# Patient Record
Sex: MALE | Race: WHITE | NOT HISPANIC OR LATINO | Employment: FULL TIME | URBAN - METROPOLITAN AREA
[De-identification: names, ages, dates, MRNs, and addresses within clinical notes are randomized per-mention and may not be internally consistent; named-entity substitution may affect disease eponyms.]

---

## 2020-05-17 ENCOUNTER — NURSE TRIAGE (OUTPATIENT)
Dept: NURSING | Facility: CLINIC | Age: 30
End: 2020-05-17

## 2020-05-17 NOTE — TELEPHONE ENCOUNTER
"\"I was lifting weights and doing pull ups last week , then I noticed some chest pressure and tightness feeling on the left side, paola under the pec area. Now it's more in the center of my chest, no pain. If I take a deep breath it doesn't make it worse, it's mild and it comes and goes.\" denies cough, SOB, fever or other sx. Triaged and advised if sx worsen go to ER, otherwise call PCP(non [facility] pt) in am 5/18 for an appt. Call back if needed.Mine Díaz RN [facility]    COVID 19 Nurse Triage Plan/Patient Instructions    Please be aware that novel coronavirus (COVID-19) may be circulating in the community. If you develop symptoms such as fever, cough, or SOB or if you have concerns about the presence of another infection including coronavirus (COVID-19), please contact your health care provider or visit [email or web address].     Disposition/Instructions    Patient to have scheduled Telephone Visit with a provider. Follow System Ambulatory Workflow for COVID 19.     The clinic staff will assist you to schedule an appointment to complete the Telephone Visit with a provider during normal clinic hours.       Call Back If: Your symptoms worsen before you are able to complete your Telephone Visit with a provider.    Thank you for limiting contact with others, wearing a simple mask to cover your cough, practice good hand hygiene habits and accessing our virtual services where possible to limit the spread of this virus.    For more information about COVID19 and options for caring for yourself at home, please visit the CDC website at https://www.cdc.gov/coronavirus/2019-ncov/about/steps-when-sick.html  For more options for care at [facility], please visit our website at [email or web address]    For more information, please use the [place] Department of Health COVID-19 Website: https://www.health.state.[place]/diseases/coronavirus/index.html  [place] Department of Health ([facility]) " "COVID-19 Hotlines (Interpreters available):      Health questions: Phone Number: [phone] or [phone] and Hours: 7 a.m. to 7 p.m.    Schools and  questions: Phone Number: [phone] or [phone] and Hours 7 a.m. to 7 p.m.                  Additional Information    Negative: SEVERE chest pain    Negative: [1] Intermittent  chest pain or \"angina\" AND [2] increasing in severity or frequency  (Exception: pains lasting a few seconds)    Negative: Pain also present in shoulder(s) or arm(s) or jaw  (Exception: pain is clearly made worse by movement)    Negative: Difficulty breathing    Negative: Dizziness or lightheadedness    Negative: Coughing up blood    Negative: Cocaine use within last 3 days    Negative: History of prior \"blood clot\" in leg or lungs (i.e., deep vein thrombosis, pulmonary embolism)    Negative: Recent illness requiring prolonged bedrest (i.e., immobilization)    Negative: Hip or leg fracture in past 2 months (e.g., had cast on leg or ankle)    Negative: Major surgery in the past month    Negative: Recent long-distance travel with prolonged time in car, bus, plane, or train (i.e., within past 2 weeks; 6 or  more hours duration)    Negative: Chest pain lasts > 5 minutes (Exceptions: chest pain occurring > 3 days ago and now asymptomatic; same as previously diagnosed heartburn and has accompanying sour taste in mouth)    Negative: Taking a deep breath makes pain worse    Negative: Patient sounds very sick or weak to the triager    Negative: [1] Chest pain lasts > 5 minutes AND [2] occurred > 3 days ago (72 hours) AND [3] NO chest pain or cardiac symptoms now    Negative: [1] Chest pain lasting <= 5 minutes AND [2] NO chest pain or cardiac symptoms now(Exceptions: pains lasting a few seconds)    Negative: Fever > 100.5 F (38.1 C)    Negative: Rash in same area as pain (may be described as \"small blisters\")    Negative: [1] Patient claims chest pain is same as previously " "diagnosed \"heartburn\" AND [2] describes burning in chest AND [3] accompanying sour taste in mouth    Negative: [1] Chest pain lasting <= 5 minutes AND [2] has not taken prescribed nitroglycerin    Negative: [1] Chest pain lasting <= 5 minutes AND [2] completely relieved by nitroglycerin    Negative: [1] Intermittent chest pain from \"angina\" AND [2] NO increase in severity or frequency    Negative: Chest pain(s) lasting a few seconds from coughing AND [2] persists > 3 days    [1] Chest pain(s) lasting a few seconds AND [2] persists > 3 days    Protocols used: CHEST PAIN-A-AH      "

## 2020-05-21 ENCOUNTER — VIRTUAL VISIT (OUTPATIENT)
Dept: FAMILY MEDICINE | Facility: CLINIC | Age: 30
End: 2020-05-21
Payer: COMMERCIAL

## 2020-05-21 ENCOUNTER — ALLIED HEALTH/NURSE VISIT (OUTPATIENT)
Dept: FAMILY MEDICINE | Facility: CLINIC | Age: 30
End: 2020-05-21
Payer: COMMERCIAL

## 2020-05-21 VITALS
TEMPERATURE: 98.8 F | HEART RATE: 62 BPM | WEIGHT: 171 LBS | SYSTOLIC BLOOD PRESSURE: 124 MMHG | DIASTOLIC BLOOD PRESSURE: 68 MMHG | RESPIRATION RATE: 14 BRPM | OXYGEN SATURATION: 95 %

## 2020-05-21 DIAGNOSIS — R07.9 CHEST PAIN, UNSPECIFIED TYPE: Primary | ICD-10-CM

## 2020-05-21 DIAGNOSIS — R07.9 CHEST PAIN, UNSPECIFIED TYPE: ICD-10-CM

## 2020-05-21 PROBLEM — M72.2 PLANTAR FASCIITIS, BILATERAL: Status: ACTIVE | Noted: 2017-06-13

## 2020-05-21 PROBLEM — M77.41 METATARSALGIA, RIGHT FOOT: Status: ACTIVE | Noted: 2017-06-13

## 2020-05-21 PROBLEM — M76.62 ACHILLES TENDINITIS OF LEFT LOWER EXTREMITY: Status: ACTIVE | Noted: 2017-10-17

## 2020-05-21 PROBLEM — M76.70 PERONEAL TENDINITIS: Status: ACTIVE | Noted: 2017-06-13

## 2020-05-21 PROCEDURE — 99214 OFFICE O/P EST MOD 30 MIN: CPT | Mod: GT | Performed by: PHYSICIAN ASSISTANT

## 2020-05-21 PROCEDURE — 93000 ELECTROCARDIOGRAM COMPLETE: CPT

## 2020-05-21 RX ORDER — FAMOTIDINE 40 MG/1
40 TABLET, FILM COATED ORAL 2 TIMES DAILY
Qty: 60 TABLET | Refills: 0 | Status: SHIPPED | OUTPATIENT
Start: 2020-05-21 | End: 2022-09-22

## 2020-05-21 NOTE — PATIENT INSTRUCTIONS
If having chest pressure/tightness and significant shortness of breath call 911 and go to the ER.    If chest pressure/tightness is worsening and you are having new symptoms such as nausea, profuse sweating, racing heart, shortness of breath (that is not severe/significant), go to Urgent Care or call clinic to be seen in person.     If not improving with the Pepcid in the next 1-2 weeks call clinic to get in person appointment.      Patient Education     Tips to Control Acid Reflux  To control acid reflux, you ll need to make some basic diet and lifestyle changes. The simple steps outlined below may be all you ll need to ease discomfort.  Watch what you eat    Avoid fatty foods and spicy foods.    Eat fewer acidic foods, such as citrus and tomato-based foods. These can increase symptoms.    Limit drinking alcohol, caffeine, and fizzy beverages. All increase acid reflux.    Try limiting chocolate, peppermint, and spearmint. These can worsen acid reflux in some people.  Watch when you eat    Avoid lying down for 3 hours after eating.    Do not snack before going to bed.  Raise your head  Raising your head and upper body by 4 to 6 inches helps limit reflux when you re lying down. Put blocks under the head of your bed frame to raise it.  Other changes    Lose weight, if you need to    Don t exercise near bedtime    Avoid tight-fitting clothes    Limit aspirin and ibuprofen    Stop smoking   Date Last Reviewed: 7/1/2016 2000-2019 [employer]. [address] [facility], [place] [address]. All rights reserved. This information is not intended as a substitute for professional medical care. Always follow your healthcare professional's instructions.

## 2020-05-21 NOTE — PROGRESS NOTES
"Josue Rangel is a 29 year old male who is being evaluated via a billable video visit.      The patient has been notified of following:     \"This video visit will be conducted via a call between you and your physician/provider. We have found that certain health care needs can be provided without the need for an in-person physical exam.  This service lets us provide the care you need with a video conversation.  If a prescription is necessary we can send it directly to your pharmacy.  If lab work is needed we can place an order for that and you can then stop by our lab to have the test done at a later time.    Video visits are billed at different rates depending on your insurance coverage.  Please reach out to your insurance provider with any questions.    If during the course of the call the physician/provider feels a video visit is not appropriate, you will not be charged for this service.\"    Patient has given verbal consent for Video visit? Yes    How would you like to obtain your AVS? [employer]    Patient would like the video invitation sent by: Text to cell phone: [phone]    Will anyone else be joining your video visit? No    Subjective     Josue Rangel is a 29 year old male who presents today via video visit for the following health issues:    HPI  CHEST PAIN     Onset: 5/9/20 (almost 2 weeks)    Discomfort under left pectoral muscle (he thought maybe heartburn and had some burping or muscle because he had recently started lifting weights again. He notes he has also been anxious recently as well.    Description:   Location:  Started on left side last weekend shifted towards middle of chest now today feeling more on left side  Character: slight pressure, tightness/slight heaviness  Radiation: none  Duration: intermittent - comes and goes throughout the day (lasting 10 seconds at at time)    Intensity: 1/10    Progression of Symptoms:  same    Accompanying Signs & Symptoms:  Shortness of breath: no "   Sweating: no   Nausea/vomiting: no   Lightheadedness: no   Palpitations: no  Fever/Chills: no   Cough: no   Heartburn: no  - but worse after eating and after drinking a caffeinated beverage   History:   Family history of heart disease: parents with high BP; maternal grandfather with heart attack (passed of heart attack in his 40's-50's)  Tobacco use: no     Precipitating factors:   Worse with exertion: no   Worse with deep breaths :  no   Related to food: patient states had caffinee beverage today and noticed sx      Stopped exercising and has not changed symptoms (he notes he is still active with his job but not lifting weights etc).  He does not notice the symptoms as much when at his job.     Therapies Tried and outcome: none      Video Start Time: 1:00 PM    Reviewed and updated as needed this visit by Provider  Allergies  Meds  Problems  Med Hx  Surg Hx  Fam Hx         Review of Systems   GENERAL:  No fevers  RESP:  No shortness of breath  CARDIAC: As noted in HPI      Objective    There were no vitals taken for this visit.  There is no height or weight on file to calculate BMI.  Physical Exam     GENERAL: Healthy, alert and no distress  EYES: Eyes grossly normal to inspection.  No discharge or erythema, or obvious scleral/conjunctival abnormalities.  RESP: No audible wheeze, cough, or visible cyanosis.  No visible retractions or increased work of breathing.    SKIN: Visible skin clear. No significant rash, abnormal pigmentation or lesions.  NEURO: Cranial nerves grossly intact.  Mentation and speech appropriate for age.  PSYCH: Mentation appears normal, affect normal/bright, judgement and insight intact, normal speech and appearance well-groomed.    During nurse only visit exam performed as noted below.  Cardiac: Regular rate and rhythm. No murmurs.  Pulmonary: Clear to auscultation  Musculoskeletal: No tenderness over the left pectoral muscle (where patient indicates pain is located)    Diagnostic  Test Results:  EKG ordered and pending        Assessment & Plan     1. Chest pain, unspecified type  Patient symptoms are most likely related to reflux or musculoskeletal pain.  His only risk factor at this time is his grandfather's history of heart attack at a young age.  Symptoms do not seem to be worsened with exertion and in fact seem to improve when he is at work and more active.  He does note that symptoms worsen with caffeinated beverages as well as certain foods.  At this time I will treat him with Pepcid as noted below.  EKG ordered along with nurse only vitals to be done later today or tomorrow.  Vitals will rule out hypertension or tachycardia.  If EKG is normal and symptoms improved with Pepcid no further evaluation is needed.  If symptoms are not improving I recommended he come in for an in clinic evaluation in order to get a more thorough exam.  If symptoms are worsening or he begins to have new symptoms such as nausea, diaphoresis, palpitations or shortness of breath that I felt he needed to be seen sooner in person at perhaps an urgent care.  I went over these instructions with patient.  He was given information regarding reflux and ways to control it with diet.    EKG normal and vital normal (see nurse only visit). Continue with plan for treatment of reflux.  - EKG 12-lead complete w/read - Clinics; Future  - famotidine (PEPCID) 40 MG tablet; Take 1 tablet (40 mg) by mouth 2 times daily  Dispense: 60 tablet; Refill: 0       See Patient Instructions    Return if symptoms worsen or fail to improve- 1-2 weeks if not better.    Tammy Laguna PA-C  [facility]      Video-Visit Details    Type of service:  Video Visit    Video End Time:1:24 PM    Originating Location (pt. Location): Home    Distant Location (provider location):  [facility]     Platform used for Video Visit: Lio    Return if symptoms worsen or fail to improve- 1-2 weeks if not better.     Tammy  MELITA Laguna PA-C

## 2020-11-14 ENCOUNTER — HEALTH MAINTENANCE LETTER (OUTPATIENT)
Age: 30
End: 2020-11-14

## 2021-06-02 ENCOUNTER — RECORDS - HEALTHEAST (OUTPATIENT)
Dept: ADMINISTRATIVE | Facility: CLINIC | Age: 31
End: 2021-06-02

## 2021-09-12 ENCOUNTER — HEALTH MAINTENANCE LETTER (OUTPATIENT)
Age: 31
End: 2021-09-12

## 2022-01-02 ENCOUNTER — HEALTH MAINTENANCE LETTER (OUTPATIENT)
Age: 32
End: 2022-01-02

## 2022-04-23 ENCOUNTER — LAB (OUTPATIENT)
Dept: URGENT CARE | Facility: URGENT CARE | Age: 32
End: 2022-04-23
Attending: FAMILY MEDICINE
Payer: COMMERCIAL

## 2022-04-23 DIAGNOSIS — Z20.822 SUSPECTED 2019 NOVEL CORONAVIRUS INFECTION: ICD-10-CM

## 2022-04-23 LAB — SARS-COV-2 RNA RESP QL NAA+PROBE: NEGATIVE

## 2022-04-23 PROCEDURE — U0005 INFEC AGEN DETEC AMPLI PROBE: HCPCS

## 2022-04-23 PROCEDURE — U0003 INFECTIOUS AGENT DETECTION BY NUCLEIC ACID (DNA OR RNA); SEVERE ACUTE RESPIRATORY SYNDROME CORONAVIRUS 2 (SARS-COV-2) (CORONAVIRUS DISEASE [COVID-19]), AMPLIFIED PROBE TECHNIQUE, MAKING USE OF HIGH THROUGHPUT TECHNOLOGIES AS DESCRIBED BY CMS-2020-01-R: HCPCS

## 2022-09-22 ENCOUNTER — OFFICE VISIT (OUTPATIENT)
Dept: FAMILY MEDICINE | Facility: CLINIC | Age: 32
End: 2022-09-22
Payer: COMMERCIAL

## 2022-09-22 VITALS
OXYGEN SATURATION: 95 % | DIASTOLIC BLOOD PRESSURE: 84 MMHG | BODY MASS INDEX: 25.62 KG/M2 | TEMPERATURE: 98.1 F | SYSTOLIC BLOOD PRESSURE: 134 MMHG | HEART RATE: 78 BPM | HEIGHT: 71 IN | WEIGHT: 183 LBS

## 2022-09-22 DIAGNOSIS — Z00.00 ROUTINE GENERAL MEDICAL EXAMINATION AT A HEALTH CARE FACILITY: Primary | ICD-10-CM

## 2022-09-22 DIAGNOSIS — F33.0 MILD EPISODE OF RECURRENT MAJOR DEPRESSIVE DISORDER (H): ICD-10-CM

## 2022-09-22 DIAGNOSIS — M72.2 PLANTAR FASCIITIS, BILATERAL: ICD-10-CM

## 2022-09-22 DIAGNOSIS — Z23 HIGH PRIORITY FOR 2019-NCOV VACCINE: ICD-10-CM

## 2022-09-22 DIAGNOSIS — Z23 NEED FOR PROPHYLACTIC VACCINATION AND INOCULATION AGAINST INFLUENZA: ICD-10-CM

## 2022-09-22 DIAGNOSIS — Z11.4 SCREENING FOR HIV (HUMAN IMMUNODEFICIENCY VIRUS): ICD-10-CM

## 2022-09-22 DIAGNOSIS — Z11.59 NEED FOR HEPATITIS C SCREENING TEST: ICD-10-CM

## 2022-09-22 PROCEDURE — 99213 OFFICE O/P EST LOW 20 MIN: CPT | Mod: 25 | Performed by: FAMILY MEDICINE

## 2022-09-22 PROCEDURE — 91312 COVID-19,PF,PFIZER BOOSTER BIVALENT: CPT | Performed by: FAMILY MEDICINE

## 2022-09-22 PROCEDURE — 99395 PREV VISIT EST AGE 18-39: CPT | Mod: 25 | Performed by: FAMILY MEDICINE

## 2022-09-22 PROCEDURE — 90686 IIV4 VACC NO PRSV 0.5 ML IM: CPT | Performed by: FAMILY MEDICINE

## 2022-09-22 PROCEDURE — 0124A COVID-19,PF,PFIZER BOOSTER BIVALENT: CPT | Performed by: FAMILY MEDICINE

## 2022-09-22 PROCEDURE — 90471 IMMUNIZATION ADMIN: CPT | Performed by: FAMILY MEDICINE

## 2022-09-22 SDOH — ECONOMIC STABILITY: FOOD INSECURITY: WITHIN THE PAST 12 MONTHS, THE FOOD YOU BOUGHT JUST DIDN'T LAST AND YOU DIDN'T HAVE MONEY TO GET MORE.: NEVER TRUE

## 2022-09-22 SDOH — HEALTH STABILITY: PHYSICAL HEALTH: ON AVERAGE, HOW MANY DAYS PER WEEK DO YOU ENGAGE IN MODERATE TO STRENUOUS EXERCISE (LIKE A BRISK WALK)?: 5 DAYS

## 2022-09-22 SDOH — ECONOMIC STABILITY: INCOME INSECURITY: IN THE LAST 12 MONTHS, WAS THERE A TIME WHEN YOU WERE NOT ABLE TO PAY THE MORTGAGE OR RENT ON TIME?: NO

## 2022-09-22 SDOH — ECONOMIC STABILITY: INCOME INSECURITY: HOW HARD IS IT FOR YOU TO PAY FOR THE VERY BASICS LIKE FOOD, HOUSING, MEDICAL CARE, AND HEATING?: NOT VERY HARD

## 2022-09-22 SDOH — ECONOMIC STABILITY: TRANSPORTATION INSECURITY
IN THE PAST 12 MONTHS, HAS LACK OF TRANSPORTATION KEPT YOU FROM MEETINGS, WORK, OR FROM GETTING THINGS NEEDED FOR DAILY LIVING?: NO

## 2022-09-22 SDOH — ECONOMIC STABILITY: TRANSPORTATION INSECURITY
IN THE PAST 12 MONTHS, HAS THE LACK OF TRANSPORTATION KEPT YOU FROM MEDICAL APPOINTMENTS OR FROM GETTING MEDICATIONS?: NO

## 2022-09-22 SDOH — ECONOMIC STABILITY: FOOD INSECURITY: WITHIN THE PAST 12 MONTHS, YOU WORRIED THAT YOUR FOOD WOULD RUN OUT BEFORE YOU GOT MONEY TO BUY MORE.: NEVER TRUE

## 2022-09-22 SDOH — HEALTH STABILITY: PHYSICAL HEALTH: ON AVERAGE, HOW MANY MINUTES DO YOU ENGAGE IN EXERCISE AT THIS LEVEL?: 50 MIN

## 2022-09-22 ASSESSMENT — ENCOUNTER SYMPTOMS
JOINT SWELLING: 0
FREQUENCY: 0
MYALGIAS: 0
SORE THROAT: 0
HEADACHES: 0
CONSTIPATION: 0
HEARTBURN: 0
NAUSEA: 0
HEMATOCHEZIA: 0
CHILLS: 0
PALPITATIONS: 0
DIARRHEA: 0
ABDOMINAL PAIN: 0
SHORTNESS OF BREATH: 0
COUGH: 0
WEAKNESS: 0
DYSURIA: 0
DIZZINESS: 0
ARTHRALGIAS: 0
NERVOUS/ANXIOUS: 0
FEVER: 0
PARESTHESIAS: 0
EYE PAIN: 0
HEMATURIA: 0

## 2022-09-22 ASSESSMENT — ANXIETY QUESTIONNAIRES
5. BEING SO RESTLESS THAT IT IS HARD TO SIT STILL: SEVERAL DAYS
1. FEELING NERVOUS, ANXIOUS, OR ON EDGE: SEVERAL DAYS
6. BECOMING EASILY ANNOYED OR IRRITABLE: NOT AT ALL
IF YOU CHECKED OFF ANY PROBLEMS ON THIS QUESTIONNAIRE, HOW DIFFICULT HAVE THESE PROBLEMS MADE IT FOR YOU TO DO YOUR WORK, TAKE CARE OF THINGS AT HOME, OR GET ALONG WITH OTHER PEOPLE: SOMEWHAT DIFFICULT
3. WORRYING TOO MUCH ABOUT DIFFERENT THINGS: SEVERAL DAYS
2. NOT BEING ABLE TO STOP OR CONTROL WORRYING: NOT AT ALL
GAD7 TOTAL SCORE: 3
GAD7 TOTAL SCORE: 3
7. FEELING AFRAID AS IF SOMETHING AWFUL MIGHT HAPPEN: NOT AT ALL

## 2022-09-22 ASSESSMENT — SOCIAL DETERMINANTS OF HEALTH (SDOH)
DO YOU BELONG TO ANY CLUBS OR ORGANIZATIONS SUCH AS CHURCH GROUPS UNIONS, FRATERNAL OR ATHLETIC GROUPS, OR SCHOOL GROUPS?: NO
HOW OFTEN DO YOU GET TOGETHER WITH FRIENDS OR RELATIVES?: ONCE A WEEK
IN A TYPICAL WEEK, HOW MANY TIMES DO YOU TALK ON THE PHONE WITH FAMILY, FRIENDS, OR NEIGHBORS?: ONCE A WEEK
HOW OFTEN DO YOU ATTEND CHURCH OR RELIGIOUS SERVICES?: NEVER

## 2022-09-22 ASSESSMENT — LIFESTYLE VARIABLES
HOW MANY STANDARD DRINKS CONTAINING ALCOHOL DO YOU HAVE ON A TYPICAL DAY: 1 OR 2
HOW OFTEN DO YOU HAVE A DRINK CONTAINING ALCOHOL: 2-4 TIMES A MONTH
SKIP TO QUESTIONS 9-10: 0
AUDIT-C TOTAL SCORE: 3
HOW OFTEN DO YOU HAVE SIX OR MORE DRINKS ON ONE OCCASION: LESS THAN MONTHLY

## 2022-09-22 ASSESSMENT — PATIENT HEALTH QUESTIONNAIRE - PHQ9
SUM OF ALL RESPONSES TO PHQ QUESTIONS 1-9: 8
5. POOR APPETITE OR OVEREATING: NOT AT ALL

## 2022-09-22 NOTE — PROGRESS NOTES
SUBJECTIVE:   CC: Josue is an 31 year old who presents for preventative health visit.     Here for a physical.    Concerns:    1.  Questions about feet, would like to get new orthotics.  Developed plantar fasciitis bilaterally, wears custom orthotics, would like to get updated orthotics. Does note that he has been having some foot pains recently.    2.  Mental health: has been struggling a little bit, mostly at night when going to sleep, has negative internal thoughts, doubt.  Saw a therapist in college, found that helpful.    3.  Immunizations    Patient has been advised of split billing requirements and indicates understanding: Yes  Healthy Habits:     Getting at least 3 servings of Calcium per day:  Yes    Bi-annual eye exam:  NO    Dental care twice a year:  NO    Sleep apnea or symptoms of sleep apnea:  None    Diet:  Regular (no restrictions)    Frequency of exercise:  4-5 days/week    Duration of exercise:  30-45 minutes    Taking medications regularly:  Yes    Medication side effects:  Not applicable and None    PHQ-2 Total Score: 2    Additional concerns today:  Yes      Today's PHQ-2 Score:   PHQ-2 ( 1999 Pfizer) 9/22/2022   Q1: Little interest or pleasure in doing things 1   Q2: Feeling down, depressed or hopeless 1   PHQ-2 Score 2   Q1: Little interest or pleasure in doing things Several days   Q2: Feeling down, depressed or hopeless Several days   PHQ-2 Score 2     PHQ 9/22/2022   PHQ-9 Total Score 8   Q9: Thoughts of better off dead/self-harm past 2 weeks Not at all     PRESLEY-7 SCORE 9/22/2022   Total Score 3       Abuse: Current or Past(Physical, Sexual or Emotional)- No  Do you feel safe in your environment? Yes      Social History     Tobacco Use     Smoking status: Never Smoker     Smokeless tobacco: Never Used   Substance Use Topics     Alcohol use: Yes     Comment: very little         Alcohol Use 9/22/2022   Prescreen: >3 drinks/day or >7 drinks/week? No       Last PSA: No results found for:  PSA    Reviewed orders with patient. Reviewed health maintenance and updated orders accordingly - Yes  Lab work is in process  Labs reviewed in EPIC  BP Readings from Last 3 Encounters:   09/22/22 134/84   05/21/20 124/68    Wt Readings from Last 3 Encounters:   09/22/22 83 kg (183 lb)   05/21/20 77.6 kg (171 lb)                  Patient Active Problem List   Diagnosis     Achilles tendinitis of left lower extremity     Metatarsalgia, right foot     Peroneal tendinitis     Plantar fasciitis, bilateral     No past surgical history on file.    Social History     Tobacco Use     Smoking status: Never Smoker     Smokeless tobacco: Never Used   Substance Use Topics     Alcohol use: Yes     Comment: very little     Family History   Problem Relation Age of Onset     Hypertension Mother      Hypertension Father      Breast Cancer Maternal Grandmother      Coronary Artery Disease Maternal Grandfather          No current outpatient medications on file.     No Known Allergies  No lab results found.     Reviewed and updated as needed this visit by clinical staff   Tobacco  Allergies       Soc Hx          Reviewed and updated as needed this visit by Provider                   No past medical history on file.   No past surgical history on file.    Review of Systems   Constitutional: Negative for chills and fever.   HENT: Negative for congestion, ear pain, hearing loss and sore throat.    Eyes: Negative for pain and visual disturbance.   Respiratory: Negative for cough and shortness of breath.    Cardiovascular: Negative for chest pain, palpitations and peripheral edema.   Gastrointestinal: Negative for abdominal pain, constipation, diarrhea, heartburn, hematochezia and nausea.   Genitourinary: Negative for dysuria, frequency, genital sores, hematuria, impotence, penile discharge and urgency.   Musculoskeletal: Negative for arthralgias, joint swelling and myalgias.   Skin: Negative for rash.   Neurological: Negative for  "dizziness, weakness, headaches and paresthesias.   Psychiatric/Behavioral: Negative for mood changes. The patient is not nervous/anxious.          OBJECTIVE:   /84 (BP Location: Right arm, Patient Position: Sitting, Cuff Size: Adult Regular)   Pulse 78   Temp 98.1  F (36.7  C) (Oral)   Ht 1.803 m (5' 11\")   Wt 83 kg (183 lb)   SpO2 95%   BMI 25.52 kg/m      Physical Exam  GENERAL: healthy, alert and no distress  EYES: Eyes grossly normal to inspection, PERRL and conjunctivae and sclerae normal  HENT: ear canals and TM's normal, nose and mouth without ulcers or lesions  NECK: no adenopathy, no asymmetry, masses, or scars and thyroid normal to palpation  RESP: lungs clear to auscultation - no rales, rhonchi or wheezes  CV: regular rate and rhythm, normal S1 S2, no S3 or S4, no murmur, click or rub, no peripheral edema and peripheral pulses strong  ABDOMEN: soft, nontender, no hepatosplenomegaly, no masses and bowel sounds normal  MS: no gross musculoskeletal defects noted, no edema  SKIN: no suspicious lesions or rashes  NEURO: Normal strength and tone, mentation intact and speech normal  PSYCH: mentation appears normal, affect normal/bright    Diagnostic Test Results:  Labs reviewed in Epic    ASSESSMENT/PLAN:   (Z00.00) Routine general medical examination at a health care facility  (primary encounter diagnosis)  Comment: Exam completed today, routine health maintenance items updated as able.  Labs ordered.  Follow up one year or sooner as needed.    (M72.2) Plantar fasciitis, bilateral  Comment: Referral for new orthotics.  If foot pains do not improve with new orthotics, will send to podiatry.  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  Will place him in contact with [facility] Dee Cortes for now.  Plan: " "Adult Mental Health  Referral, SAD         Light, 10,000 Lux Order for DME - ONLY FOR DME            (Z11.4) Screening for HIV (human immunodeficiency virus)  Plan: HIV Antigen Antibody Combo            (Z11.59) Need for hepatitis C screening test  Plan: Hepatitis C Screen Reflex to HCV RNA Quant and         Genotype            (Z23) Need for prophylactic vaccination and inoculation against influenza  Plan: INFLUENZA VACCINE IM > 6 MONTHS VALENT IIV4         (AFLURIA/FLUZONE)            (Z23) High priority for 2019-nCoV vaccine  Plan: COVID-19,PF,PFIZER BOOSTER BIVALENT 12+Yrs              Patient has been advised of split billing requirements and indicates understanding: Yes    COUNSELING:   Reviewed preventive health counseling, as reflected in patient instructions    Estimated body mass index is 25.52 kg/m  as calculated from the following:    Height as of this encounter: 1.803 m (5' 11\").    Weight as of this encounter: 83 kg (183 lb).         He reports that he has never smoked. He has never used smokeless tobacco.      Counseling Resources:  ATP IV Guidelines  Pooled Cohorts Equation Calculator  FRAX Risk Assessment  ICSI Preventive Guidelines  Dietary Guidelines for Americans, 2010  USDA's MyPlate  ASA Prophylaxis  Lung CA Screening    April GREGORIO Valentin MD  [facility]"

## 2022-09-27 NOTE — COMMUNITY RESOURCES LIST (ENGLISH)
09/22/2022   [facility] - Outpatient Clinics  N/A  For questions about this resource list or additional care needs, please contact your primary care clinic or care manager.  Phone: [phone]   Email: N/A   Address: [address] [place] [id number]   Hours: N/A        Mental Health       Individual counseling  1  [facility] Distance: 0.41 miles      COVID-19 Status: Phone/Virtual   [address] [place] [address]  Language: English  Hours: Mon - Thu 8:30 AM - 5:30 PM , Fri 8:30 AM - 4:00 PM  Fees: Insurance, Self Pay   Phone: (140) 727-5903 Email: [email or web address] Website: [email or web address]     2  [facility] Distance: 0.94 miles      COVID-19 Status: Regular Operations, COVID-19 Status: Phone/Virtual   [address] [place] [address]  Language: English  Hours: Mon - Thu 7:30 AM - 5:00 PM , Fri 7:30 AM - 4:30 PM  Fees: Insurance, Self Pay   Phone: (875) 321-8620 Email: office@[employer] Website: http://[email or web address]/     Mental health support group  3  [facility] Distance: 5.78 miles      COVID-19 Status: Phone/Virtual   [address] [address] [place] [address]  Language: English  Hours: Mon - Fri 9:00 AM - 5:00 PM Appt. Only  Fees: Free   Phone: (753) 817-3872 Email: [email or web address] Website: [email or web address]/     4  [facility] (Main Office) Distance: 9.76 miles      COVID-19 Status: Phone/Virtual   [address] [place] [address]  Language: English  Hours: Mon - Fri 9:00 AM - 5:00 PM  Fees: Free   Phone: (614) 411-6024 Email: info@[employer].org Website: [email or web address].org          Important Numbers & Websites       Emergency Services   [address]  [place] [address]  Poison Control   (919) 151-9483  Suicide Prevention Lifeline   (515) 217-1259 (TALK)  Child  Abuse Hotline   (872) 570-1164 (4-A-Child)  Sexual Assault Hotline   (678) 725-2238 (HOPE)  National Runaway Safeline   (994) 184-7338 (RUNAWAY)  All-Options Talkline   (464) 106-4929  Substance Abuse Referral   (580) 750-6702 (HELP)

## 2022-10-06 ENCOUNTER — TELEPHONE (OUTPATIENT)
Dept: FAMILY MEDICINE | Facility: CLINIC | Age: 32
End: 2022-10-06

## 2022-10-06 NOTE — TELEPHONE ENCOUNTER
Called pt, unable to leave msg. Voice  mailbox is full. Sent My Chart msg to patient. Ruth Behrens

## 2023-11-18 ENCOUNTER — HEALTH MAINTENANCE LETTER (OUTPATIENT)
Age: 33
End: 2023-11-18

## 2024-12-29 ENCOUNTER — HEALTH MAINTENANCE LETTER (OUTPATIENT)
Age: 34
End: 2024-12-29

## 2025-06-09 ENCOUNTER — OFFICE VISIT (OUTPATIENT)
Dept: PEDIATRICS | Facility: CLINIC | Age: 35
End: 2025-06-09
Payer: COMMERCIAL

## 2025-06-09 VITALS
BODY MASS INDEX: 25.76 KG/M2 | DIASTOLIC BLOOD PRESSURE: 78 MMHG | OXYGEN SATURATION: 100 % | RESPIRATION RATE: 16 BRPM | HEART RATE: 57 BPM | TEMPERATURE: 97.9 F | WEIGHT: 184 LBS | SYSTOLIC BLOOD PRESSURE: 120 MMHG | HEIGHT: 71 IN

## 2025-06-09 DIAGNOSIS — Z13.6 SCREENING FOR CARDIOVASCULAR CONDITION: ICD-10-CM

## 2025-06-09 DIAGNOSIS — Z30.2 ENCOUNTER FOR VASECTOMY: ICD-10-CM

## 2025-06-09 DIAGNOSIS — Z00.00 ROUTINE GENERAL MEDICAL EXAMINATION AT A HEALTH CARE FACILITY: Primary | ICD-10-CM

## 2025-06-09 DIAGNOSIS — Z13.1 SCREENING FOR DIABETES MELLITUS: ICD-10-CM

## 2025-06-09 PROBLEM — M77.41 METATARSALGIA, RIGHT FOOT: Status: RESOLVED | Noted: 2017-06-13 | Resolved: 2025-06-09

## 2025-06-09 PROBLEM — M76.70 PERONEAL TENDINITIS: Status: RESOLVED | Noted: 2017-06-13 | Resolved: 2025-06-09

## 2025-06-09 PROBLEM — M72.2 PLANTAR FASCIITIS, BILATERAL: Status: RESOLVED | Noted: 2017-06-13 | Resolved: 2025-06-09

## 2025-06-09 LAB
CHOLEST SERPL-MCNC: 186 MG/DL
FASTING STATUS PATIENT QL REPORTED: NO
FASTING STATUS PATIENT QL REPORTED: NO
GLUCOSE SERPL-MCNC: 92 MG/DL (ref 70–99)
HDLC SERPL-MCNC: 49 MG/DL
LDLC SERPL CALC-MCNC: 113 MG/DL
NONHDLC SERPL-MCNC: 137 MG/DL
TRIGL SERPL-MCNC: 118 MG/DL

## 2025-06-09 PROCEDURE — 99395 PREV VISIT EST AGE 18-39: CPT | Performed by: PEDIATRICS

## 2025-06-09 PROCEDURE — 80061 LIPID PANEL: CPT | Performed by: PEDIATRICS

## 2025-06-09 PROCEDURE — 1126F AMNT PAIN NOTED NONE PRSNT: CPT | Performed by: PEDIATRICS

## 2025-06-09 PROCEDURE — 3074F SYST BP LT 130 MM HG: CPT | Performed by: PEDIATRICS

## 2025-06-09 PROCEDURE — 82947 ASSAY GLUCOSE BLOOD QUANT: CPT | Performed by: PEDIATRICS

## 2025-06-09 PROCEDURE — 3078F DIAST BP <80 MM HG: CPT | Performed by: PEDIATRICS

## 2025-06-09 PROCEDURE — 36415 COLL VENOUS BLD VENIPUNCTURE: CPT | Performed by: PEDIATRICS

## 2025-06-09 SDOH — HEALTH STABILITY: PHYSICAL HEALTH: ON AVERAGE, HOW MANY MINUTES DO YOU ENGAGE IN EXERCISE AT THIS LEVEL?: 30 MIN

## 2025-06-09 SDOH — HEALTH STABILITY: PHYSICAL HEALTH: ON AVERAGE, HOW MANY DAYS PER WEEK DO YOU ENGAGE IN MODERATE TO STRENUOUS EXERCISE (LIKE A BRISK WALK)?: 4 DAYS

## 2025-06-09 ASSESSMENT — PATIENT HEALTH QUESTIONNAIRE - PHQ9
SUM OF ALL RESPONSES TO PHQ QUESTIONS 1-9: 2
SUM OF ALL RESPONSES TO PHQ QUESTIONS 1-9: 2
10. IF YOU CHECKED OFF ANY PROBLEMS, HOW DIFFICULT HAVE THESE PROBLEMS MADE IT FOR YOU TO DO YOUR WORK, TAKE CARE OF THINGS AT HOME, OR GET ALONG WITH OTHER PEOPLE: NOT DIFFICULT AT ALL

## 2025-06-09 ASSESSMENT — PAIN SCALES - GENERAL: PAINLEVEL_OUTOF10: NO PAIN (0)

## 2025-06-09 ASSESSMENT — SOCIAL DETERMINANTS OF HEALTH (SDOH): HOW OFTEN DO YOU GET TOGETHER WITH FRIENDS OR RELATIVES?: TWICE A WEEK

## 2025-06-09 NOTE — PROGRESS NOTES
"Preventive Care Visit  [facility], Internal Medicine - Pediatrics  Jun 9, 2025      Assessment & Plan     (Z00.00) Routine general medical examination at a health care facility  (primary encounter diagnosis)  Comment:   Plan:     (Z13.1) Screening for diabetes mellitus  Comment:   Plan: Glucose            (Z13.6) Screening for cardiovascular condition  Comment:   Plan: Lipid Profile (Chol, Trig, HDL, LDL calc)            (Z30.2) Encounter for vasectomy  Comment:   Plan: Adult Urology  Referral                    BMI  Estimated body mass index is 25.66 kg/m  as calculated from the following:    Height as of this encounter: 1.803 m (5' 11\").    Weight as of this encounter: 83.5 kg (184 lb).       Counseling  Appropriate preventive services were addressed with this patient via screening, questionnaire, or discussion as appropriate for fall prevention, nutrition, physical activity, Tobacco-use cessation, social engagement, weight loss and cognition.  Checklist reviewing preventive services available has been given to the patient.  Reviewed patient's diet, addressing concerns and/or questions.   He is at risk for psychosocial distress and has been provided with information to reduce risk.           Fara Salas is a 34 year old, presenting for the following:  Physical        6/9/2025     9:41 AM   Additional Questions   Roomed by ROSAMARIA REINOSO   Accompanied by SELF          HPI     Patient wondering about vasectomy.    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        6/9/2025   General Health   How would you rate your overall physical health? Good   Feel stress (tense, anxious, or unable to sleep) To some extent   (!) STRESS " CONCERN      6/9/2025   Nutrition   Three or more servings of calcium each day? Yes   Diet: Regular (no restrictions)   How many servings of fruit and vegetables per day? (!) 2-3   How many sweetened beverages each day? 0-1         6/9/2025   Exercise   Days per week of moderate/strenous exercise 4 days   Average minutes spent exercising at this level 30 min         6/9/2025   Social Factors   Frequency of gathering with friends or relatives Twice a week   Worry food won't last until get money to buy more No   Food not last or not have enough money for food? No   Do you have housing? (Housing is defined as stable permanent housing and does not include staying outside in a car, in a tent, in an abandoned building, in an overnight shelter, or couch-surfing.) Yes   Are you worried about losing your housing? No   Lack of transportation? No   Unable to get utilities (heat,electricity)? No         6/9/2025   Dental   Dentist two times every year? Yes       Today's PHQ-9 Score:       6/9/2025     9:32 AM   PHQ-9 SCORE   PHQ-9 Total Score MyChart 2 (Minimal depression)   PHQ-9 Total Score 2        Patient-reported         6/9/2025   Substance Use   Alcohol more than 3/day or more than 7/wk No   Do you use any other substances recreationally? No     Social History     Tobacco Use    Smoking status: Never    Smokeless tobacco: Never   Vaping Use    Vaping status: Never Used   Substance Use Topics    Alcohol use: Yes     Comment: very little    Drug use: Never             6/9/2025   One time HIV Screening   Previous HIV test? No         6/9/2025   STI Screening   New sexual partner(s) since last STI/HIV test? No         6/9/2025   Contraception/Family Planning   Questions about contraception or family planning (!) YES requesting vascectomy        Reviewed and updated as needed this visit by Provider                             Objective    Exam  /78   Pulse 57   Temp 97.9  F (36.6  C) (Tympanic)   Resp 16   Ht 1.803  "m (5' 11\")   Wt 83.5 kg (184 lb)   SpO2 100%   BMI 25.66 kg/m     Estimated body mass index is 25.66 kg/m  as calculated from the following:    Height as of this encounter: 1.803 m (5' 11\").    Weight as of this encounter: 83.5 kg (184 lb).    Physical Exam  GENERAL: alert and no distress  EYES: Eyes grossly normal to inspection, PERRL and conjunctivae and sclerae normal  HENT: ear canals and TM's normal, nose and mouth without ulcers or lesions  NECK: no adenopathy, no asymmetry, masses, or scars  RESP: lungs clear to auscultation - no rales, rhonchi or wheezes  CV: regular rate and rhythm, normal S1 S2, no S3 or S4, no murmur, click or rub, no peripheral edema  ABDOMEN: soft, nontender, no hepatosplenomegaly, no masses and bowel sounds normal  MS: no gross musculoskeletal defects noted, no edema  SKIN: no suspicious lesions or rashes  NEURO: Normal strength and tone, mentation intact and speech normal  PSYCH: mentation appears normal, affect normal/bright        Signed Electronically by: Natalie Salcedo MD    "

## 2025-06-09 NOTE — PATIENT INSTRUCTIONS
Patient Education   Preventive Care Advice   This is general advice given by our system to help you stay healthy. However, your care team may have specific advice just for you. Please talk to your care team about your preventive care needs.  Nutrition  Eat 5 or more servings of fruits and vegetables each day.  Try wheat bread, brown rice and whole grain pasta (instead of white bread, rice, and pasta).  Get enough calcium and vitamin D. Check the label on foods and aim for 100% of the RDA (recommended daily allowance).  Lifestyle  Exercise at least 150 minutes each week  (30 minutes a day, 5 days a week).  Do muscle strengthening activities 2 days a week. These help control your weight and prevent disease.  No smoking.  Wear sunscreen to prevent skin cancer.  Have a dental exam and cleaning every 6 months.  Yearly exams  See your health care team every year to talk about:  Any changes in your health.  Any medicines your care team has prescribed.  Preventive care, family planning, and ways to prevent chronic diseases.  Shots (vaccines)   HPV shots (up to age 26), if you've never had them before.  Hepatitis B shots (up to age 59), if you've never had them before.  COVID-19 shot: Get this shot when it's due.  Flu shot: Get a flu shot every year.  Tetanus shot: Get a tetanus shot every 10 years.  Pneumococcal, hepatitis A, and RSV shots: Ask your care team if you need these based on your risk.  Shingles shot (for age 50 and up)  General health tests  Diabetes screening:  Starting at age 35, Get screened for diabetes at least every 3 years.  If you are younger than age 35, ask your care team if you should be screened for diabetes.  Cholesterol test: At age 39, start having a cholesterol test every 5 years, or more often if advised.  Bone density scan (DEXA): At age 50, ask your care team if you should have this scan for osteoporosis (brittle bones).  Hepatitis C: Get tested at least once in your life.  STIs (sexually  transmitted infections)  Before age 24: Ask your care team if you should be screened for STIs.  After age 24: Get screened for STIs if you're at risk. You are at risk for STIs (including HIV) if:  You are sexually active with more than one person.  You don't use condoms every time.  You or a partner was diagnosed with a sexually transmitted infection.  If you are at risk for HIV, ask about PrEP medicine to prevent HIV.  Get tested for HIV at least once in your life, whether you are at risk for HIV or not.  Cancer screening tests  Cervical cancer screening: If you have a cervix, begin getting regular cervical cancer screening tests starting at age 21.  Breast cancer scan (mammogram): If you've ever had breasts, begin having regular mammograms starting at age 40. This is a scan to check for breast cancer.  Colon cancer screening: It is important to start screening for colon cancer at age 45.  Have a colonoscopy test every 10 years (or more often if you're at risk) Or, ask your provider about stool tests like a FIT test every year or Cologuard test every 3 years.  To learn more about your testing options, visit:   .  For help making a decision, visit:   https://bit.ly/nv45257.  Prostate cancer screening test: If you have a prostate, ask your care team if a prostate cancer screening test (PSA) at age 55 is right for you.  Lung cancer screening: If you are a current or former smoker ages 50 to 80, ask your care team if ongoing lung cancer screenings are right for you.  For informational purposes only. Not to replace the advice of your health care provider. Copyright   2023 [facility]. All rights reserved. Clinically reviewed by the [facility]. [employer] 425247 - REV 01/24.  Learning About Stress  What is stress?     Stress is your body's response to a hard situation. Your body can have a physical, emotional, or mental response. Stress is a fact of life for most people, and it  affects everyone differently. What causes stress for you may not be stressful for someone else.  A lot of things can cause stress. You may feel stress when you go on a job interview, take a test, or run a race. This kind of short-term stress is normal and even useful. It can help you if you need to work hard or react quickly. For example, stress can help you finish an important job on time.  Long-term stress is caused by ongoing stressful situations or events. Examples of long-term stress include long-term health problems, ongoing problems at work, or conflicts in your family. Long-term stress can harm your health.  How does stress affect your health?  When you are stressed, your body responds as though you are in danger. It makes hormones that speed up your heart, make you breathe faster, and give you a burst of energy. This is called the fight-or-flight stress response. If the stress is over quickly, your body goes back to normal and no harm is done.  But if stress happens too often or lasts too long, it can have bad effects. Long-term stress can make you more likely to get sick, and it can make symptoms of some diseases worse. If you tense up when you are stressed, you may develop neck, shoulder, or low back pain. Stress is linked to high blood pressure and heart disease.  Stress also harms your emotional health. It can make you thomas, tense, or depressed. Your relationships may suffer, and you may not do well at work or school.  What can you do to manage stress?  You can try these things to help manage stress:   Do something active. Exercise or activity can help reduce stress. Walking is a great way to get started. Even everyday activities such as housecleaning or yard work can help.  Try yoga or jace chi. These techniques combine exercise and meditation. You may need some training at first to learn them.  Do something you enjoy. For example, listen to music or go to a movie. Practice your hobby or do volunteer  "work.  Meditate. This can help you relax, because you are not worrying about what happened before or what may happen in the future.  Do guided imagery. Imagine yourself in any setting that helps you feel calm. You can use online videos, books, or a teacher to guide you.  Do breathing exercises. For example:  From a standing position, bend forward from the waist with your knees slightly bent. Let your arms dangle close to the floor.  Breathe in slowly and deeply as you return to a standing position. Roll up slowly and lift your head last.  Hold your breath for just a few seconds in the standing position.  Breathe out slowly and bend forward from the waist.  Let your feelings out. Talk, laugh, cry, and express anger when you need to. Talking with supportive friends or family, a counselor, or a ashok leader about your feelings is a healthy way to relieve stress. Avoid discussing your feelings with people who make you feel worse.  Write. It may help to write about things that are bothering you. This helps you find out how much stress you feel and what is causing it. When you know this, you can find better ways to cope.  What can you do to prevent stress?  You might try some of these things to help prevent stress:  Manage your time. This helps you find time to do the things you want and need to do.  Get enough sleep. Your body recovers from the stresses of the day while you are sleeping.  Get support. Your family, friends, and community can make a difference in how you experience stress.  Limit your news feed. Avoid or limit time on social media or news that may make you feel stressed.  Do something active. Exercise or activity can help reduce stress. Walking is a great way to get started.  Where can you learn more?  Go to https://www.Baboo.net/patiented  Enter N032 in the search box to learn more about \"Learning About Stress.\"  Current as of: October 24, 2024  Content Version: 14.4 2024-2025 [employer].   Care instructions adapted under license by your healthcare professional. If you have questions about a medical condition or this instruction, always ask your healthcare professional. [employer] disclaims any warranty or liability for your use of this information.

## 2025-06-09 NOTE — PROGRESS NOTES
Preventive Care Visit  [facility]  Natalie Salcedo MD, Internal Medicine - Pediatrics  Jun 9, 2025  {Provider  Link to [facility] SmartSet :980650}  Assessment & Plan   34 year old, here for preventive care.    {Diag Picklist:115172}  {Patient advised of split billing (Optional):918209}  Growth      {GROWTH:597226}    Immunizations   {Vaccine counseling is expected when vaccines are given for the first time.   Vaccine counseling would not be expected for subsequent vaccines (after the first of the series) unless there is significant additional documentation:779562}    Anticipatory Guidance    Reviewed age appropriate anticipatory guidance.       Referrals/Ongoing Specialty Care  {Referrals/Ongoing Specialty Care:675050}    {Follow-up (Optional):359802}  Subjective   Josue is presenting for the following:  Physical      ***        6/9/2025     9:41 AM   Additional Questions   Accompanied by SELF           6/9/2025   Social   Lack of transportation has limited access to appts/meds No   Do you have housing? (Housing is defined as stable permanent housing and does not include staying outside in a car, in a tent, in an abandoned building, in an overnight shelter, or couch-surfing.) Yes   Are you worried about losing your housing? No          No data to display                     No data to display                       6/9/2025   Diet   In past 12 months, concerned food might run out No   In past 12 months, food has run out/couldn't afford more No          No data to display                   No data to display                  Teen Screen  {Provider  Link to Confidential Note :492711}  {Results  (18-20 YRS):980020}         Objective     Exam      Physical Exam      {Immunization Screening- Place Screening for Ped Immunizations order or choose appropriate list to document responses in note (Optional):750220}  Signed Electronically by: Natalie Salcedo MD  {Email feedback regarding this note to  [email or web address]   :639076}  Answers submitted by the patient for this visit:  Patient Health Questionnaire (Submitted on 6/9/2025)  If you checked off any problems, how difficult have these problems made it for you to do your work, take care of things at home, or get along with other people?: Not difficult at all  PHQ9 TOTAL SCORE: 2

## 2025-06-10 ENCOUNTER — RESULTS FOLLOW-UP (OUTPATIENT)
Dept: PEDIATRICS | Facility: CLINIC | Age: 35
End: 2025-06-10

## 2025-06-10 ENCOUNTER — PATIENT OUTREACH (OUTPATIENT)
Dept: CARE COORDINATION | Facility: CLINIC | Age: 35
End: 2025-06-10
Payer: COMMERCIAL

## 2025-06-12 ENCOUNTER — PATIENT OUTREACH (OUTPATIENT)
Dept: CARE COORDINATION | Facility: CLINIC | Age: 35
End: 2025-06-12
Payer: COMMERCIAL

## 2025-06-23 ENCOUNTER — VIRTUAL VISIT (OUTPATIENT)
Dept: UROLOGY | Facility: CLINIC | Age: 35
End: 2025-06-23
Attending: PEDIATRICS
Payer: COMMERCIAL

## 2025-06-23 DIAGNOSIS — Z30.2 ENCOUNTER FOR VASECTOMY: ICD-10-CM

## 2025-06-23 PROCEDURE — 1126F AMNT PAIN NOTED NONE PRSNT: CPT | Mod: 95 | Performed by: STUDENT IN AN ORGANIZED HEALTH CARE EDUCATION/TRAINING PROGRAM

## 2025-06-23 PROCEDURE — 98001 SYNCH AUDIO-VIDEO NEW LOW 30: CPT | Performed by: STUDENT IN AN ORGANIZED HEALTH CARE EDUCATION/TRAINING PROGRAM

## 2025-06-23 ASSESSMENT — PAIN SCALES - GENERAL: PAINLEVEL_OUTOF10: NO PAIN (0)

## 2025-06-23 NOTE — PROGRESS NOTES
"Virtual Visit Details    Type of service:  Video Visit     Originating Location (pt. Location): {video visit patient location:585143::\"Home\"}  {PROVIDER LOCATION On-site should be selected for visits conducted from your clinic location or adjoining [facility] hospital, academic office, or other nearby [facility] building. Off-site should be selected for all other provider locations, including home:773652}  Distant Location (provider location):  {virtual location provider:095723}  Platform used for Video Visit: {Virtual Visit Platforms:644972::\"[employer]\"}  "

## 2025-06-23 NOTE — NURSING NOTE
Current patient location: [address] [place] [id number]    Is the patient currently in the state of [place]? YES    Visit mode: VIDEO    If the visit is dropped, the patient can be reconnected by:VIDEO VISIT: Text to cell phone:   Telephone Information:   Mobile [phone]       Will anyone else be joining the visit? NO  (If patient encounters technical issues they should call [phone] :[phone])    Are changes needed to the allergy or medication list? No    Are refills needed on medications prescribed by this physician? NO    Rooming Documentation:  Questionnaire(s) completed    Reason for visit: Consult    Lori BENZ

## 2025-06-23 NOTE — Clinical Note
Hi Dr. Salcedo-  I saw Josue via virtual visit. He is somewhat nervous about the vasectomy procedure. He is going to think about whether he wants to do it in the office with valium vs. The OR under sedation. He will let me know.  Please don't hesitate to contact me with further questions. I greatly appreciate the referral.   Gail Elizalde MD Cell: [phone]

## 2025-06-23 NOTE — PROGRESS NOTES
VASECTOMY CONSULTATION NOTE  DATE OF VISIT: 6/23/2025  PATIENT NAME: Josue Rangel    YOB: 1990      REASON FOR CONSULTATION: Mr. Josue Rangel is a 34 year old year old gentleman who is seen today requesting a vasectomy as a form of permanent birth control.     He has a 4 year-old and 2 year-old.     No prior scrotal/inguinal surgeries, no hx diabetes, he does not take blood thinners, he does not believe his scrotum is tight.     He is admittedly somewhat anxious about the procedure.     PAST MEDICAL HISTORY:   Past Medical History:   Diagnosis Date    Metatarsalgia, right foot 06/13/2017    Peroneal tendinitis 06/13/2017    Plantar fasciitis, bilateral 06/13/2017       PAST SURGICAL HISTORY: No past surgical history on file.    MEDICATIONS: No current outpatient medications on file.    ALLERGIES: No Known Allergies    FAMILY HISTORY:   Family History   Problem Relation Age of Onset    Hypertension Mother     Hypertension Father     Breast Cancer Maternal Grandmother     Coronary Artery Disease Maternal Grandfather        SOCIAL HISTORY:   Social History     Socioeconomic History    Marital status:      Spouse name: Not on file    Number of children: Not on file    Years of education: Not on file    Highest education level: Not on file   Occupational History    Not on file   Tobacco Use    Smoking status: Never    Smokeless tobacco: Never   Vaping Use    Vaping status: Never Used   Substance and Sexual Activity    Alcohol use: Yes     Comment: very little    Drug use: Never    Sexual activity: Yes     Partners: Female   Other Topics Concern    Not on file   Social History Narrative    Not on file     Social Drivers of Health     Financial Resource Strain: Low Risk  (6/9/2025)    Financial Resource Strain     Within the past 12 months, have you or your family members you live with been unable to get utilities (heat, electricity) when it was really needed?: No   Food Insecurity: Low Risk   (6/9/2025)    Food Insecurity     Within the past 12 months, did you worry that your food would run out before you got money to buy more?: No     Within the past 12 months, did the food you bought just not last and you didn t have money to get more?: No   Transportation Needs: Low Risk  (6/9/2025)    Transportation Needs     Within the past 12 months, has lack of transportation kept you from medical appointments, getting your medicines, non-medical meetings or appointments, work, or from getting things that you need?: No   Physical Activity: Insufficiently Active (6/9/2025)    Exercise Vital Sign     Days of Exercise per Week: 4 days     Minutes of Exercise per Session: 30 min   Stress: Stress Concern Present (6/9/2025)    [employer] - Occupational Stress Questionnaire     Feeling of Stress : To some extent   Social Connections: Unknown (6/9/2025)    Social Connection and Isolation Panel [NHANES]     Frequency of Communication with Friends and Family: Not on file     Frequency of Social Gatherings with Friends and Family: Twice a week     Attends Episcopal Services: Not on file     Active Member of Clubs or Organizations: Not on file     Attends Club or Organization Meetings: Not on file     Marital Status: Not on file   Interpersonal Safety: Low Risk  (6/9/2025)    Interpersonal Safety     Do you feel physically and emotionally safe where you currently live?: Yes     Within the past 12 months, have you been hit, slapped, kicked or otherwise physically hurt by someone?: No     Within the past 12 months, have you been humiliated or emotionally abused in other ways by your partner or ex-partner?: No   Housing Stability: Low Risk  (6/9/2025)    Housing Stability     Do you have housing? : Yes     Are you worried about losing your housing?: No       Due to the nature of this video visit, no physical exam was performed.     DIAGNOSIS: Request for sterilization    PLAN: The risks of the  procedure as well as expectations for recovery and outcomes were explained in detail to him.  He was counseled on the risks for bleeding infection and pain after the procedure. We discussed the risk of post-vasectomy pain syndrome.  He was instructed to continue to use contraception until he had proven azoospermia on a semen specimen.  This would normally be collected at least 3 months after the procedure. Also discussed the rare, but possible risk of re-canalization of the vas, even after successful vasectomy with sterile semen specimen.  He was instructed to hold all anticoagulants medications for one week prior to the procedure.  It was recommended that he have someone else drive him home after his vasectomy.      Pt. Understands:  -1/1000-1/3000 risk of future pregnancy even with perfectly done vasectomy  -vasectomy is a permanent procedure    -he may cryopreserve sperm if he wishes   -1-5% risk of post-vasectomy pain syndrome   -1-5% risk of complication, primarily infection or bleeding  - he needs to have a semen sample that shows no sperm before getting approval for unprotected intercourse.      He will think about his options (clinic w/ Valium vs. OR) and let me know his decision.     Thank you for the kind consultation.    15 minutes spent on the date of the encounter, including direct interaction with the patient, performing chart review, documentation and further activities as noted above.    Video-Visit Details  The patient consents to today's video visit: yes    Type of service:  Video Visit    Video Start Time: 4:24 PM    Video End Time:4:38 PM    Originating Location (pt. Location): Home    Distant Location (provider location):  [facility]    Platform used for Video Visit: Lio Elizalde MD   Urology  [facility]  Clinic Phone [phone]

## 2025-06-23 NOTE — LETTER
6/23/2025       RE: Josue Rangel  [address] [facility] [address]     Dear Colleague,    Thank you for referring your patient, Josue Rangel, to the [facility] at [facility]. Please see a copy of my visit note below.    VASECTOMY CONSULTATION NOTE  DATE OF VISIT: 6/23/2025  PATIENT NAME: Josue Rangel    YOB: 1990      REASON FOR CONSULTATION: Mr. Josue Rangel is a 34 year old year old gentleman who is seen today requesting a vasectomy as a form of permanent birth control.     He has a 4 year-old and 2 year-old.     No prior scrotal/inguinal surgeries, no hx diabetes, he does not take blood thinners, he does not believe his scrotum is tight.     He is admittedly somewhat anxious about the procedure.     PAST MEDICAL HISTORY:   Past Medical History:   Diagnosis Date     Metatarsalgia, right foot 06/13/2017     Peroneal tendinitis 06/13/2017     Plantar fasciitis, bilateral 06/13/2017       PAST SURGICAL HISTORY: No past surgical history on file.    MEDICATIONS: No current outpatient medications on file.    ALLERGIES: No Known Allergies    FAMILY HISTORY:   Family History   Problem Relation Age of Onset     Hypertension Mother      Hypertension Father      Breast Cancer Maternal Grandmother      Coronary Artery Disease Maternal Grandfather        SOCIAL HISTORY:   Social History     Socioeconomic History     Marital status:      Spouse name: Not on file     Number of children: Not on file     Years of education: Not on file     Highest education level: Not on file   Occupational History     Not on file   Tobacco Use     Smoking status: Never     Smokeless tobacco: Never   Vaping Use     Vaping status: Never Used   Substance and Sexual Activity     Alcohol use: Yes     Comment: very little     Drug use: Never     Sexual activity: Yes     Partners: Female   Other Topics Concern     Not on file   Social History  Narrative     Not on file     Social Drivers of Health     Financial Resource Strain: Low Risk  (6/9/2025)    Financial Resource Strain      Within the past 12 months, have you or your family members you live with been unable to get utilities (heat, electricity) when it was really needed?: No   Food Insecurity: Low Risk  (6/9/2025)    Food Insecurity      Within the past 12 months, did you worry that your food would run out before you got money to buy more?: No      Within the past 12 months, did the food you bought just not last and you didn t have money to get more?: No   Transportation Needs: Low Risk  (6/9/2025)    Transportation Needs      Within the past 12 months, has lack of transportation kept you from medical appointments, getting your medicines, non-medical meetings or appointments, work, or from getting things that you need?: No   Physical Activity: Insufficiently Active (6/9/2025)    Exercise Vital Sign      Days of Exercise per Week: 4 days      Minutes of Exercise per Session: 30 min   Stress: Stress Concern Present (6/9/2025)    [employer] [place] [employer] - Occupational Stress Questionnaire      Feeling of Stress : To some extent   Social Connections: Unknown (6/9/2025)    Social Connection and Isolation Panel [NHANES]      Frequency of Communication with Friends and Family: Not on file      Frequency of Social Gatherings with Friends and Family: Twice a week      Attends Orthodoxy Services: Not on file      Active Member of Clubs or Organizations: Not on file      Attends Club or Organization Meetings: Not on file      Marital Status: Not on file   Interpersonal Safety: Low Risk  (6/9/2025)    Interpersonal Safety      Do you feel physically and emotionally safe where you currently live?: Yes      Within the past 12 months, have you been hit, slapped, kicked or otherwise physically hurt by someone?: No      Within the past 12 months, have you been humiliated or emotionally abused in  other ways by your partner or ex-partner?: No   Housing Stability: Low Risk  (6/9/2025)    Housing Stability      Do you have housing? : Yes      Are you worried about losing your housing?: No       Due to the nature of this video visit, no physical exam was performed.     DIAGNOSIS: Request for sterilization    PLAN: The risks of the procedure as well as expectations for recovery and outcomes were explained in detail to him.  He was counseled on the risks for bleeding infection and pain after the procedure. We discussed the risk of post-vasectomy pain syndrome.  He was instructed to continue to use contraception until he had proven azoospermia on a semen specimen.  This would normally be collected at least 3 months after the procedure. Also discussed the rare, but possible risk of re-canalization of the vas, even after successful vasectomy with sterile semen specimen.  He was instructed to hold all anticoagulants medications for one week prior to the procedure.  It was recommended that he have someone else drive him home after his vasectomy.      Pt. Understands:  -1/1000-1/3000 risk of future pregnancy even with perfectly done vasectomy  -vasectomy is a permanent procedure    -he may cryopreserve sperm if he wishes   -1-5% risk of post-vasectomy pain syndrome   -1-5% risk of complication, primarily infection or bleeding  - he needs to have a semen sample that shows no sperm before getting approval for unprotected intercourse.      He will think about his options (clinic w/ Valium vs. OR) and let me know his decision.     Thank you for the kind consultation.    15 minutes spent on the date of the encounter, including direct interaction with the patient, performing chart review, documentation and further activities as noted above.    Video-Visit Details  The patient consents to today's video visit: yes    Type of service:  Video Visit    Video Start Time: 4:24 PM    Video End Time:4:38 PM    Originating Location  (pt. Location): Home    Distant Location (provider location):  [facility]    Platform used for Video Visit: Lio Elizalde MD   Urology  [facility]  Clinic Phone [phone]      Again, thank you for allowing me to participate in the care of your patient.      Sincerely,    Gail Elizalde MD

## 2025-06-24 ENCOUNTER — TELEPHONE (OUTPATIENT)
Dept: UROLOGY | Facility: CLINIC | Age: 35
End: 2025-06-24
Payer: COMMERCIAL

## 2025-06-24 NOTE — TELEPHONE ENCOUNTER
----- Message from Breanne RODAS sent at 6/24/2025  9:53 AM CDT -----  Regarding: Vasectomy  Schedule a vasectomy in the office     MARGOTH  6/23/25

## 2025-06-24 NOTE — TELEPHONE ENCOUNTER
Aime TO SCHEDULE    [facility]  [place] [facility] [place]   Complex and Surgery Scheduler   [phone]

## 2025-06-28 ENCOUNTER — OFFICE VISIT (OUTPATIENT)
Dept: URGENT CARE | Facility: URGENT CARE | Age: 35
End: 2025-06-28
Payer: COMMERCIAL

## 2025-06-28 VITALS
HEART RATE: 68 BPM | HEIGHT: 71 IN | BODY MASS INDEX: 25.9 KG/M2 | SYSTOLIC BLOOD PRESSURE: 129 MMHG | OXYGEN SATURATION: 98 % | TEMPERATURE: 97.1 F | DIASTOLIC BLOOD PRESSURE: 78 MMHG | RESPIRATION RATE: 14 BRPM | WEIGHT: 185 LBS

## 2025-06-28 DIAGNOSIS — R07.0 THROAT PAIN: Primary | ICD-10-CM

## 2025-06-28 LAB — DEPRECATED S PYO AG THROAT QL EIA: NEGATIVE

## 2025-06-28 PROCEDURE — 3074F SYST BP LT 130 MM HG: CPT | Performed by: NURSE PRACTITIONER

## 2025-06-28 PROCEDURE — 3078F DIAST BP <80 MM HG: CPT | Performed by: NURSE PRACTITIONER

## 2025-06-28 PROCEDURE — 87651 STREP A DNA AMP PROBE: CPT | Performed by: NURSE PRACTITIONER

## 2025-06-28 PROCEDURE — 99213 OFFICE O/P EST LOW 20 MIN: CPT | Performed by: NURSE PRACTITIONER

## 2025-06-28 NOTE — PROGRESS NOTES
Urgent Care Clinic Visit    Chief Complaint   Patient presents with    Urgent Care     Son has Strep. 3-5 days ago patient started having throat pain. Fatigue, Pain when eating/talking radiating to ear    Denies - Fever, Cough,                6/28/2025     6:45 PM   Additional Questions   Roomed by ALMA Bernabe   Accompanied by Self     Does the patient have a sore throat and either history of fever >100.4 in the previous 24 hours or recent exposure to a known case of strep throat? Yes  Does the patient have a productive cough that started within the past 7 days? No     Assessment & Plan     Throat pain  - Streptococcus A Rapid Screen w/Reflex to PCR - Clinic Collect  - Group A Streptococcus PCR Throat Swab     Patient Instructions     Results for orders placed or performed in visit on 06/28/25   Streptococcus A Rapid Screen w/Reflex to PCR - Clinic Collect     Status: Normal    Specimen: Throat; Swab   Result Value Ref Range    Group A Strep antigen Negative Negative       RST negative TC  swab pending.    Push fluids  Lots of handwashing.   Ibuprofen as needed for fever or pain  Delsym or dayquil/nyquil for cough as needed     Rest as able.   Will call if any other labs positive.    F/u in the clinic if symptoms persist or worsen.        Return in about 1 week (around 7/5/2025) for with regular provider if symptoms persist.    [facility] JERZY Salas is a 34 year old male who presents to clinic today for the following health issues:  Chief Complaint   Patient presents with    Urgent Care     Son has Strep. 3-5 days ago patient started having throat pain. Fatigue, Pain when eating/talking radiating to ear    Denies - Fever, Cough,          6/28/2025     6:45 PM   Additional Questions   Roomed by ALMA Bernabe   Accompanied by Self     HPI      URI Adult    Onset of symptoms was 2 day(s) ago.  Course of illness is same.    Severity moderate  Current and  "Associated symptoms: fever and sore throat  Treatment measures tried include Tylenol/Ibuprofen and Fluids.  Predisposing factors include exposure to strep.      Review of Systems  Constitutional, HEENT, cardiovascular, pulmonary, GI, , musculoskeletal, neuro, skin, endocrine and psych systems are negative, except as otherwise noted.      Objective    /78   Pulse 68   Temp 97.1  F (36.2  C) (Tympanic)   Resp 14   Ht 1.803 m (5' 11\")   Wt 83.9 kg (185 lb)   SpO2 98%   BMI 25.80 kg/m    Physical Exam   GENERAL: alert and no distress  EYES: Eyes grossly normal to inspection, PERRL and conjunctivae and sclerae normal  HENT: ear canals and TM's normal, nose and mouth without ulcers or lesions  NECK: no adenopathy, no asymmetry, masses, or scars  RESP: lungs clear to auscultation - no rales, rhonchi or wheezes  CV: regular rate and rhythm, normal S1 S2, no S3 or S4, no murmur, click or rub, no peripheral edema  MS: no gross musculoskeletal defects noted, no edema          "

## 2025-06-29 LAB — S PYO DNA THROAT QL NAA+PROBE: DETECTED

## 2025-06-29 NOTE — PATIENT INSTRUCTIONS
Results for orders placed or performed in visit on 06/28/25   Streptococcus A Rapid Screen w/Reflex to PCR - Clinic Collect     Status: Normal    Specimen: Throat; Swab   Result Value Ref Range    Group A Strep antigen Negative Negative       RST negative TC  swab pending.    Push fluids  Lots of handwashing.   Ibuprofen as needed for fever or pain  Delsym or dayquil/nyquil for cough as needed     Rest as able.   Will call if any other labs positive.    F/u in the clinic if symptoms persist or worsen.

## 2025-07-03 ENCOUNTER — OFFICE VISIT (OUTPATIENT)
Dept: URGENT CARE | Facility: URGENT CARE | Age: 35
End: 2025-07-03
Payer: COMMERCIAL

## 2025-07-03 VITALS
SYSTOLIC BLOOD PRESSURE: 118 MMHG | BODY MASS INDEX: 25.38 KG/M2 | DIASTOLIC BLOOD PRESSURE: 75 MMHG | HEART RATE: 75 BPM | WEIGHT: 182 LBS | RESPIRATION RATE: 18 BRPM | OXYGEN SATURATION: 96 % | TEMPERATURE: 98.5 F

## 2025-07-03 DIAGNOSIS — J02.0 STREPTOCOCCAL PHARYNGITIS: Primary | ICD-10-CM

## 2025-07-03 NOTE — PROGRESS NOTES
ICD-10-CM    1. Streptococcal pharyngitis  J02.0         Continue amoxicillin, salt water gargles.Rest.  Fluids.    Tylenol or ibuprofen as needed for fever or pain.  Recheck in 10 days if symptoms have not improved, sooner if they worsen.  Decongestant as needed.    Red flag warning signs and when to go to the emergency room discussed.  Reviewed potential adverse reactions to medications.    SUBJECTIVE:   Josue Rangel is a 34 year old male presenting with a chief complaint of   Chief Complaint   Patient presents with    Throat Pain     Patient here with continuing throat pain and gland pain on the left side after being on antibiotics for strep.    .  Patient started treatment for strep with amoxicillin 4 days ago.  He thinks his throat may be just a tiny bit better but overall is concerned he is not improving faster.    Review of systems is negative except for as noted in the HPI.    OBJECTIVE  /75 (BP Location: Right arm, Patient Position: Sitting, Cuff Size: Adult Regular)   Pulse 75   Temp 98.5  F (36.9  C) (Tympanic)   Resp 18   Wt 82.6 kg (182 lb)   SpO2 96%   BMI 25.38 kg/m        GENERAL: Alert, mild distress  SKIN: skin is clear, no rash or abnormal pigmentation  HEAD: The head is normocephalic.   EYES: The eyes are normal. The conjunctivae and cornea normal.   NECK: The neck is supple and thyroid is normal, no masses; LYMPH NODES: Bilateral cervical adenopathy worse on the left  HENT: Bilateral tympanic membranes and canals appear normal, nasal passages are clear, marked erythema of pharynx, worse on the left, uvula is midline pain  LUNGS: The lung fields are clear to auscultation, no rales, rhonchi, wheezing or retractions  CV: Rhythm is regular. S1 and S2 are normal. No murmurs.  EXTREMITIES: Symmetric extremities no deformities    Luana Pop, APRN, CNP  [place] [facility] Provider    The use of Dragon/[employer] dictation services may have been used to construct the content in this  note; any grammatical or spelling errors are non-intentional. Please contact the author of this note directly if you are in need of any clarification.

## 2025-07-03 NOTE — PROGRESS NOTES
Urgent Care Clinic Visit    Chief Complaint   Patient presents with    Throat Pain     Patient here with continuing throat pain and gland pain on the left side after being on antibiotics for strep.                7/3/2025    11:25 AM   Additional Questions   Roomed by Agatha Horner MA   Accompanied by self

## 2025-07-03 NOTE — PATIENT INSTRUCTIONS
Continue amoxicillin until it is completely gone.  Use Tylenol, ibuprofen and salt water gargles for pain.  Eat soft foods that are not spicy, acidic, or salty.